# Patient Record
Sex: MALE | Race: WHITE
[De-identification: names, ages, dates, MRNs, and addresses within clinical notes are randomized per-mention and may not be internally consistent; named-entity substitution may affect disease eponyms.]

---

## 2017-06-02 ENCOUNTER — APPOINTMENT (OUTPATIENT)
Dept: INTERNAL MEDICINE | Facility: CLINIC | Age: 67
End: 2017-06-02

## 2017-06-02 VITALS
TEMPERATURE: 98.1 F | HEIGHT: 71 IN | SYSTOLIC BLOOD PRESSURE: 140 MMHG | HEART RATE: 57 BPM | OXYGEN SATURATION: 98 % | WEIGHT: 161 LBS | DIASTOLIC BLOOD PRESSURE: 80 MMHG | BODY MASS INDEX: 22.54 KG/M2

## 2017-06-02 VITALS — SYSTOLIC BLOOD PRESSURE: 132 MMHG | DIASTOLIC BLOOD PRESSURE: 68 MMHG

## 2017-06-02 DIAGNOSIS — Z87.898 PERSONAL HISTORY OF OTHER SPECIFIED CONDITIONS: ICD-10-CM

## 2017-06-02 RX ORDER — CELECOXIB 200 MG/1
200 CAPSULE ORAL
Qty: 60 | Refills: 0 | Status: DISCONTINUED | COMMUNITY
Start: 2016-04-18 | End: 2017-06-02

## 2017-06-30 ENCOUNTER — APPOINTMENT (OUTPATIENT)
Dept: ORTHOPEDIC SURGERY | Facility: CLINIC | Age: 67
End: 2017-06-30

## 2017-09-08 ENCOUNTER — APPOINTMENT (OUTPATIENT)
Dept: INTERNAL MEDICINE | Facility: CLINIC | Age: 67
End: 2017-09-08

## 2018-07-16 ENCOUNTER — RX RENEWAL (OUTPATIENT)
Age: 68
End: 2018-07-16

## 2018-07-26 ENCOUNTER — APPOINTMENT (OUTPATIENT)
Dept: INTERNAL MEDICINE | Facility: CLINIC | Age: 68
End: 2018-07-26

## 2018-09-04 ENCOUNTER — NON-APPOINTMENT (OUTPATIENT)
Age: 68
End: 2018-09-04

## 2018-09-04 ENCOUNTER — APPOINTMENT (OUTPATIENT)
Dept: INTERNAL MEDICINE | Facility: CLINIC | Age: 68
End: 2018-09-04
Payer: MEDICARE

## 2018-09-04 VITALS
HEIGHT: 71 IN | BODY MASS INDEX: 23.1 KG/M2 | HEART RATE: 49 BPM | SYSTOLIC BLOOD PRESSURE: 120 MMHG | TEMPERATURE: 98.2 F | OXYGEN SATURATION: 98 % | DIASTOLIC BLOOD PRESSURE: 70 MMHG | WEIGHT: 165 LBS

## 2018-09-04 DIAGNOSIS — Z87.898 PERSONAL HISTORY OF OTHER SPECIFIED CONDITIONS: ICD-10-CM

## 2018-09-04 PROCEDURE — 90750 HZV VACC RECOMBINANT IM: CPT | Mod: GY

## 2018-09-04 PROCEDURE — 90471 IMMUNIZATION ADMIN: CPT | Mod: GY

## 2018-09-04 PROCEDURE — 93000 ELECTROCARDIOGRAM COMPLETE: CPT | Mod: 59

## 2018-09-04 PROCEDURE — G0439: CPT

## 2018-09-04 PROCEDURE — 82270 OCCULT BLOOD FECES: CPT

## 2018-09-04 RX ORDER — ACYCLOVIR 400 MG/1
400 TABLET ORAL
Qty: 30 | Refills: 0 | Status: DISCONTINUED | COMMUNITY
Start: 2017-01-23 | End: 2018-09-04

## 2018-09-04 RX ORDER — DEXLANSOPRAZOLE 30 MG/1
30 CAPSULE, DELAYED RELEASE ORAL
Qty: 90 | Refills: 0 | Status: DISCONTINUED | COMMUNITY
Start: 2016-09-30 | End: 2018-09-04

## 2018-09-05 VITALS — SYSTOLIC BLOOD PRESSURE: 120 MMHG | DIASTOLIC BLOOD PRESSURE: 70 MMHG

## 2018-09-05 LAB
APPEARANCE: CLEAR
BACTERIA: NEGATIVE
BILIRUBIN URINE: NEGATIVE
BLOOD URINE: NEGATIVE
COLOR: YELLOW
GLUCOSE QUALITATIVE U: NEGATIVE MG/DL
KETONES URINE: NEGATIVE
LEUKOCYTE ESTERASE URINE: NEGATIVE
MICROSCOPIC-UA: NORMAL
NITRITE URINE: NEGATIVE
PH URINE: 6
PROTEIN URINE: NEGATIVE MG/DL
RED BLOOD CELLS URINE: 0 /HPF
SPECIFIC GRAVITY URINE: 1.01
SQUAMOUS EPITHELIAL CELLS: 0 /HPF
UROBILINOGEN URINE: NEGATIVE MG/DL
WHITE BLOOD CELLS URINE: 0 /HPF

## 2018-09-05 NOTE — HISTORY OF PRESENT ILLNESS
[FreeTextEntry1] : The patient is here for a routine visit.  [de-identified] : The patient notes that he feels tired a lot- he has had this for years but it might be worse.  He mostly feels tired later in the day.   He sleeps ok- uses Zolpidem 5 mg QHS.  No dizziness, lightheadedness.\par \par He exercises 4-5 days per week and his diet is healthy.  No chest pain or dyspnea.\par \par He has been started on Escitalopram 10 mg for depression symptoms which he feels has helped.  \par \par He sees a neurologist at [place] and has yearly MRIs.   He has occasional episodes at night which might be partial seizures and his neurologist is aware.

## 2018-09-05 NOTE — ASSESSMENT
[FreeTextEntry1] : The patient has worse fatigue which might be multifactorial including related to his medications, stress,  ild depression symptoms.  he does have a more prominent bradycardia and that could be a factor.  He was advised to see his cardiologist- he will see Dr. Lozano who he has seen in the past.  (He had seen a cardiologist last year in [place] and he says a stress test was ok.)  Check blood tests including metabolic, CBC, TFTs, testosterone.\par \par He sees his neurologist.  He requests a Lamotrigine level.\par \par Check lipids on Livalo.  Discussed diet and exercise.  Check a HgBA1c- last 5.8.  \par \par Shingrix #1 today.  He isn't sure if he has had either or both pneumonia vaccines.  He will try to find out- he will need the vaccines if he can't confirm he has had them.  \par \par Colonoscopy about 9/15.  He says another is planned soon- Dr. Johnson.  \par \par He sees a dermatologist and ophthalmologist.\par \par He is on Escitalopram as above.  \par \par Check a PSA.  \par \par He isn't fasting and will have the blood tests done elsewhere. \par

## 2018-09-05 NOTE — PHYSICAL EXAM

## 2018-09-05 NOTE — HEALTH RISK ASSESSMENT
[No falls in past year] : Patient reported no falls in the past year [0] : 2) Feeling down, depressed, or hopeless: Not at all (0) [Patient reported colonoscopy was normal] : Patient reported colonoscopy was normal [Fully functional (bathing, dressing, toileting, transferring, walking, feeding)] : Fully functional (bathing, dressing, toileting, transferring, walking, feeding) [Fully functional (using the telephone, shopping, preparing meals, housekeeping, doing laundry, using] : Fully functional and needs no help or supervision to perform IADLs (using the telephone, shopping, preparing meals, housekeeping, doing laundry, using transportation, managing medications and managing finances) [] : No [[id number]] : 0 [Change in mental status noted] : No change in mental status noted [Reports changes in hearing] : Reports no changes in hearing [Reports changes in vision] : Reports no changes in vision [Reports changes in dental health] : Reports no changes in dental health [ColonoscopyDate] : 9/15

## 2019-03-14 ENCOUNTER — APPOINTMENT (OUTPATIENT)
Dept: INTERNAL MEDICINE | Facility: CLINIC | Age: 69
End: 2019-03-14
Payer: COMMERCIAL

## 2019-03-14 PROCEDURE — 90471 IMMUNIZATION ADMIN: CPT

## 2019-03-14 PROCEDURE — 90750 HZV VACC RECOMBINANT IM: CPT

## 2019-07-01 ENCOUNTER — APPOINTMENT (OUTPATIENT)
Dept: INTERNAL MEDICINE | Facility: CLINIC | Age: 69
End: 2019-07-01
Payer: MEDICARE

## 2019-07-01 VITALS
HEART RATE: 50 BPM | WEIGHT: 165 LBS | HEIGHT: 71 IN | SYSTOLIC BLOOD PRESSURE: 120 MMHG | BODY MASS INDEX: 23.1 KG/M2 | DIASTOLIC BLOOD PRESSURE: 76 MMHG | OXYGEN SATURATION: 97 % | TEMPERATURE: 97.7 F

## 2019-07-01 PROCEDURE — 99213 OFFICE O/P EST LOW 20 MIN: CPT

## 2019-07-01 NOTE — ASSESSMENT
[FreeTextEntry1] : Soft tissue swelling w/ mild ttp along left upper posterior thigh below buttock. Unclear diagnosis, could be lipoma or hematoma. Send for soft tissue ultrasound. Use Tylenol and avoid NSAID's for now. Does not need antibiotics at this time.

## 2019-07-01 NOTE — HISTORY OF PRESENT ILLNESS
[FreeTextEntry8] : Patient comes for an acute visit. \par \par He initially developed pain followed by swelling of left buttock area first noticed about 6 days ago. Denies trauma to area. He denies edema of LLE. He tried Advil with some relief. He denies gait instability. He is physically active and has no issues with workouts. He is not sure if had a bug bite to area. He denies recent travel.

## 2019-07-01 NOTE — PHYSICAL EXAM
[No Acute Distress] : no acute distress [Well Nourished] : well nourished [Well-Appearing] : well-appearing [No Respiratory Distress] : no respiratory distress  [Clear to Auscultation] : lungs were clear to auscultation bilaterally [Normal Rate] : normal rate  [Normal S1, S2] : normal S1 and S2 [No Murmur] : no murmur heard [de-identified] : no LLE edema  [de-identified] : area of swelling w/ mild ttp along left posterior leg just below buttock; no surrounding erythema, warmth, or discharge

## 2019-07-02 ENCOUNTER — APPOINTMENT (OUTPATIENT)
Dept: ULTRASOUND IMAGING | Facility: CLINIC | Age: 69
End: 2019-07-02

## 2019-07-02 ENCOUNTER — OUTPATIENT (OUTPATIENT)
Dept: OUTPATIENT SERVICES | Facility: HOSPITAL | Age: 69
LOS: 1 days | End: 2019-07-02
Payer: MEDICARE

## 2019-07-02 DIAGNOSIS — Z00.8 ENCOUNTER FOR OTHER GENERAL EXAMINATION: ICD-10-CM

## 2019-07-02 PROCEDURE — 76882 US LMTD JT/FCL EVL NVASC XTR: CPT

## 2019-07-02 PROCEDURE — 76882 US LMTD JT/FCL EVL NVASC XTR: CPT | Mod: 26,LT

## 2019-07-05 ENCOUNTER — CHART COPY (OUTPATIENT)
Age: 69
End: 2019-07-05

## 2019-07-09 ENCOUNTER — APPOINTMENT (OUTPATIENT)
Dept: MRI IMAGING | Facility: HOSPITAL | Age: 69
End: 2019-07-09
Payer: MEDICARE

## 2019-07-09 ENCOUNTER — OUTPATIENT (OUTPATIENT)
Dept: OUTPATIENT SERVICES | Facility: HOSPITAL | Age: 69
LOS: 1 days | End: 2019-07-09
Payer: MEDICARE

## 2019-07-09 DIAGNOSIS — Z00.8 ENCOUNTER FOR OTHER GENERAL EXAMINATION: ICD-10-CM

## 2019-07-09 PROCEDURE — 73719 MRI LOWER EXTREMITY W/DYE: CPT

## 2019-07-09 PROCEDURE — A9579: CPT

## 2019-07-09 PROCEDURE — 73719 MRI LOWER EXTREMITY W/DYE: CPT | Mod: 26,LT

## 2019-07-11 ENCOUNTER — APPOINTMENT (OUTPATIENT)
Dept: INTERNAL MEDICINE | Facility: CLINIC | Age: 69
End: 2019-07-11
Payer: MEDICARE

## 2019-07-11 VITALS
DIASTOLIC BLOOD PRESSURE: 70 MMHG | HEART RATE: 61 BPM | TEMPERATURE: 98.2 F | WEIGHT: 165 LBS | SYSTOLIC BLOOD PRESSURE: 120 MMHG | HEIGHT: 71 IN | OXYGEN SATURATION: 96 % | BODY MASS INDEX: 23.1 KG/M2

## 2019-07-11 PROCEDURE — 99214 OFFICE O/P EST MOD 30 MIN: CPT

## 2019-07-11 RX ORDER — LOTEPREDNOL ETABONATE 5 MG/G
0.5 GEL OPHTHALMIC
Qty: 5 | Refills: 0 | Status: DISCONTINUED | COMMUNITY
Start: 2019-06-07

## 2019-07-11 RX ORDER — CEPHALEXIN 500 MG/1
500 CAPSULE ORAL
Qty: 14 | Refills: 0 | Status: DISCONTINUED | COMMUNITY
Start: 2019-02-13

## 2019-07-11 RX ORDER — OLOPATADINE HYDROCHLORIDE 2 MG/ML
0.2 SOLUTION OPHTHALMIC
Qty: 25 | Refills: 0 | Status: DISCONTINUED | COMMUNITY
Start: 2019-06-07

## 2019-07-11 RX ORDER — FINASTERIDE 1 MG/1
1 TABLET ORAL
Refills: 0 | Status: ACTIVE | COMMUNITY
Start: 2018-09-04

## 2019-07-11 RX ORDER — ESCITALOPRAM OXALATE 10 MG/1
10 TABLET ORAL
Qty: 90 | Refills: 1 | Status: DISCONTINUED | COMMUNITY
Start: 2018-09-04 | End: 2019-07-11

## 2019-07-11 NOTE — HISTORY OF PRESENT ILLNESS
[No Pertinent Cardiac History] : no history of aortic stenosis, atrial fibrillation, coronary artery disease, recent myocardial infarction, or implantable device/pacemaker [No Pertinent Pulmonary History] : no history of asthma, COPD, sleep apnea, or smoking [No Adverse Anesthesia Reaction] : no adverse anesthesia reaction in self or family member [(Patient denies any chest pain, claudication, dyspnea on exertion, orthopnea, palpitations or syncope)] : Patient denies any chest pain, claudication, dyspnea on exertion, orthopnea, palpitations or syncope [Excellent (>10 METs)] : Excellent (>10 METs) [Anti-Platelet Agents: _____] : Anti-Platelet Agents: [unfilled] [Chronic Anticoagulation] : no chronic anticoagulation [Diabetes] : no diabetes [Chronic Kidney Disease] : no chronic kidney disease [FreeTextEntry3] : Dr. Marty Bishop [FreeTextEntry1] : Biopsy and excision of left thigh mass  [FreeTextEntry2] : 7/18/19 [FreeTextEntry4] : Patient is scheduled for excision and biopsy of left thigh mass. He initially presented to office on 7/1 with painful lump on left thigh. He had an ultrasound performed which revealed a soft tissue mass of left thigh. He then had an MRI of the left thigh on 7/9 for further evaluation. He saw orthopedic surgeon - Dr. Marty Bishop on 7/10 and was advised on surgical excision of thigh mass.

## 2019-07-11 NOTE — PHYSICAL EXAM
[No Acute Distress] : no acute distress [Well Nourished] : well nourished [Well-Appearing] : well-appearing [No Respiratory Distress] : no respiratory distress  [Clear to Auscultation] : lungs were clear to auscultation bilaterally [Normal Rate] : normal rate  [Normal S1, S2] : normal S1 and S2 [No Murmur] : no murmur heard [Normal Voice/Communication] : normal voice/communication [Normal Sclera/Conjunctiva] : normal sclera/conjunctiva [EOMI] : extraocular movements intact [PERRL] : pupils equal round and reactive to light [Supple] : supple [Normal TMs] : both tympanic membranes were normal [Normal Oropharynx] : the oropharynx was normal [No Edema] : there was no peripheral edema [No Lymphadenopathy] : no lymphadenopathy [Regular Rhythm] : with a regular rhythm [Non Tender] : non-tender [Soft] : abdomen soft [Normal Bowel Sounds] : normal bowel sounds [Non-distended] : non-distended [Normal Gait] : normal gait [No Rash] : no rash [Alert and Oriented x3] : oriented to person, place, and time [Normal Mood] : the mood was normal [Normal Affect] : the affect was normal [Normal Insight/Judgement] : insight and judgment were intact [de-identified] : mild swelling along left posterior thigh

## 2019-07-11 NOTE — RESULTS/DATA
[] : results reviewed [de-identified] : normal [de-identified] : normal [de-identified] : normal [de-identified] : workup performed on 7/10/19 [de-identified] : sinus bradycardia @ 46, no ST-T abnormalities; study unchanged  [de-identified] : no acute cardiopulmonary abnormalities

## 2019-07-11 NOTE — ASSESSMENT
[Patient Optimized for Surgery] : Patient optimized for surgery [No Further Testing Recommended] : no further testing recommended [As per surgery] : as per surgery [Modify anti-platelet treatment prior to procedure] : Modify anti-platelet treatment prior to procedure [FreeTextEntry4] : Patient is a low cardiac risk for a low risk surgical procedure. No medical contraindications for planned surgery. Preoperative workup on 7/10 including labs, EKG, and chest x-ray were all unremarkable. He has stopped the baby aspirin and will avoid NSAID's starting now and until date of surgery. He can take Tylenol as needed for pain. He has seizure disorder and will take his Lamictal as scheduled three times a day. \par  [FreeTextEntry6] : No aspirin or NSAID's starting now and until date of surgery

## 2019-07-11 NOTE — PHYSICAL EXAM
[Well Nourished] : well nourished [No Acute Distress] : no acute distress [Well-Appearing] : well-appearing [No Respiratory Distress] : no respiratory distress  [Clear to Auscultation] : lungs were clear to auscultation bilaterally [Normal Rate] : normal rate  [Normal S1, S2] : normal S1 and S2 [No Murmur] : no murmur heard [Normal Voice/Communication] : normal voice/communication [Normal Sclera/Conjunctiva] : normal sclera/conjunctiva [EOMI] : extraocular movements intact [PERRL] : pupils equal round and reactive to light [Normal TMs] : both tympanic membranes were normal [Supple] : supple [Normal Oropharynx] : the oropharynx was normal [Regular Rhythm] : with a regular rhythm [No Lymphadenopathy] : no lymphadenopathy [No Edema] : there was no peripheral edema [Soft] : abdomen soft [Non Tender] : non-tender [Normal Bowel Sounds] : normal bowel sounds [Non-distended] : non-distended [Normal Gait] : normal gait [No Rash] : no rash [Alert and Oriented x3] : oriented to person, place, and time [Normal Mood] : the mood was normal [Normal Affect] : the affect was normal [Normal Insight/Judgement] : insight and judgment were intact [de-identified] : mild swelling along left posterior thigh

## 2019-07-11 NOTE — RESULTS/DATA
[] : results reviewed [de-identified] : normal [de-identified] : normal [de-identified] : normal [de-identified] : sinus bradycardia @ 46, no ST-T abnormalities; study unchanged  [de-identified] : workup performed on 7/10/19 [de-identified] : no acute cardiopulmonary abnormalities

## 2019-07-11 NOTE — ASSESSMENT
[Patient Optimized for Surgery] : Patient optimized for surgery [No Further Testing Recommended] : no further testing recommended [As per surgery] : as per surgery [Modify anti-platelet treatment prior to procedure] : Modify anti-platelet treatment prior to procedure [FreeTextEntry6] : No aspirin or NSAID's starting now and until date of surgery [FreeTextEntry4] : Patient is a low cardiac risk for a low risk surgical procedure. No medical contraindications for planned surgery. Preoperative workup on 7/10 including labs, EKG, and chest x-ray were all unremarkable. He has stopped the baby aspirin and will avoid NSAID's starting now and until date of surgery. He can take Tylenol as needed for pain. He has seizure disorder and will take his Lamictal as scheduled three times a day. \par

## 2019-07-11 NOTE — HISTORY OF PRESENT ILLNESS
[No Pertinent Cardiac History] : no history of aortic stenosis, atrial fibrillation, coronary artery disease, recent myocardial infarction, or implantable device/pacemaker [No Pertinent Pulmonary History] : no history of asthma, COPD, sleep apnea, or smoking [No Adverse Anesthesia Reaction] : no adverse anesthesia reaction in self or family member [(Patient denies any chest pain, claudication, dyspnea on exertion, orthopnea, palpitations or syncope)] : Patient denies any chest pain, claudication, dyspnea on exertion, orthopnea, palpitations or syncope [Excellent (>10 METs)] : Excellent (>10 METs) [Anti-Platelet Agents: _____] : Anti-Platelet Agents: [unfilled] [Chronic Anticoagulation] : no chronic anticoagulation [Diabetes] : no diabetes [Chronic Kidney Disease] : no chronic kidney disease [FreeTextEntry3] : Dr. Marty Bishop [FreeTextEntry2] : 7/18/19 [FreeTextEntry1] : Biopsy and excision of left thigh mass  [FreeTextEntry4] : Patient is scheduled for excision and biopsy of left thigh mass. He initially presented to office on 7/1 with painful lump on left thigh. He had an ultrasound performed which revealed a soft tissue mass of left thigh. He then had an MRI of the left thigh on 7/9 for further evaluation. He saw orthopedic surgeon - Dr. Marty Bishop on 7/10 and was advised on surgical excision of thigh mass.

## 2020-06-29 ENCOUNTER — APPOINTMENT (OUTPATIENT)
Dept: INTERNAL MEDICINE | Facility: CLINIC | Age: 70
End: 2020-06-29
Payer: MEDICARE

## 2020-06-29 VITALS
TEMPERATURE: 98.1 F | HEART RATE: 54 BPM | HEIGHT: 69.5 IN | BODY MASS INDEX: 24.32 KG/M2 | WEIGHT: 168 LBS | OXYGEN SATURATION: 95 %

## 2020-06-29 PROCEDURE — 99214 OFFICE O/P EST MOD 30 MIN: CPT | Mod: 25

## 2020-06-29 PROCEDURE — 36415 COLL VENOUS BLD VENIPUNCTURE: CPT

## 2020-06-29 RX ORDER — ESCITALOPRAM OXALATE 10 MG/1
10 TABLET ORAL
Qty: 90 | Refills: 3 | Status: ACTIVE | COMMUNITY
Start: 2020-06-29

## 2020-07-10 VITALS — SYSTOLIC BLOOD PRESSURE: 125 MMHG | HEART RATE: 48 BPM | DIASTOLIC BLOOD PRESSURE: 70 MMHG

## 2020-07-10 NOTE — ASSESSMENT
[FreeTextEntry1] : The patient has fatigue as above, more in the afternoon.  He doesn't have specific symptoms to explain it.  He is bradycardic to 49 which could be a factor.  The symptoms might be multifactorial including bradycardia, medication related (Lamictal?) and depression could be considered.  He is on Lexapro 10 mg now which could be increased.  Also consider CATHIE as a possibility and consider a sleep evaluation.  Will check blood tests including a CBC, metabolic, TFTs.  Follow-up next week.  \par \par Also consider evaluation of the reflux symptoms.  Advise seeing an ENT and consider a GI evaluation.

## 2020-07-10 NOTE — HISTORY OF PRESENT ILLNESS
[de-identified] : The patient comes in with his wife.  He feels sluggish in the afternoon, sometimes in the morning.  It is not new but worse in the last six months.  He says he has more fatigue if he eats sugar.  He has fatigue and must lie down.  It doesn't limit him if he is busy.  No chest pain, dyspnea, lightheadedness.  he tries to exercise.\par \par He says the acid reflux is worse and he must clear his throat.  He has been told of GERD for a few years.  He's not on medication for it. No dysphagia or odynophagia.  He feels it in his throat.  His appetite is good and weight is stable.  He is not on Dexilant now.  \par \par He still has had seizures.

## 2020-07-14 LAB
ALBUMIN SERPL ELPH-MCNC: 4.6 G/DL
ALP BLD-CCNC: 73 U/L
ALT SERPL-CCNC: 19 U/L
ANION GAP SERPL CALC-SCNC: 10 MMOL/L
AST SERPL-CCNC: 26 U/L
BASOPHILS # BLD AUTO: 0.02 K/UL
BASOPHILS NFR BLD AUTO: 0.4 %
BILIRUB SERPL-MCNC: 0.3 MG/DL
BUN SERPL-MCNC: 20 MG/DL
CALCIUM SERPL-MCNC: 9.7 MG/DL
CHLORIDE SERPL-SCNC: 106 MMOL/L
CO2 SERPL-SCNC: 25 MMOL/L
CREAT SERPL-MCNC: 1.24 MG/DL
EOSINOPHIL # BLD AUTO: 0.2 K/UL
EOSINOPHIL NFR BLD AUTO: 3.8 %
ESTIMATED AVERAGE GLUCOSE: 114 MG/DL
GLUCOSE SERPL-MCNC: 87 MG/DL
HBA1C MFR BLD HPLC: 5.6 %
HCT VFR BLD CALC: 41.7 %
HGB BLD-MCNC: 14 G/DL
IMM GRANULOCYTES NFR BLD AUTO: 0.2 %
LYMPHOCYTES # BLD AUTO: 1.39 K/UL
LYMPHOCYTES NFR BLD AUTO: 26.5 %
MAN DIFF?: NORMAL
MCHC RBC-ENTMCNC: 31.8 PG
MCHC RBC-ENTMCNC: 33.6 GM/DL
MCV RBC AUTO: 94.8 FL
MONOCYTES # BLD AUTO: 0.44 K/UL
MONOCYTES NFR BLD AUTO: 8.4 %
NEUTROPHILS # BLD AUTO: 3.18 K/UL
NEUTROPHILS NFR BLD AUTO: 60.7 %
PLATELET # BLD AUTO: 232 K/UL
POTASSIUM SERPL-SCNC: 4.7 MMOL/L
PROT SERPL-MCNC: 6.4 G/DL
RBC # BLD: 4.4 M/UL
RBC # FLD: 12.9 %
SODIUM SERPL-SCNC: 141 MMOL/L
T4 FREE SERPL-MCNC: 1.1 NG/DL
TSH SERPL-ACNC: 3.5 UIU/ML
WBC # FLD AUTO: 5.24 K/UL

## 2020-09-29 ENCOUNTER — APPOINTMENT (OUTPATIENT)
Dept: INTERNAL MEDICINE | Facility: CLINIC | Age: 70
End: 2020-09-29
Payer: MEDICARE

## 2020-09-29 PROCEDURE — 90662 IIV NO PRSV INCREASED AG IM: CPT

## 2020-09-29 PROCEDURE — G0008: CPT

## 2021-06-08 ENCOUNTER — APPOINTMENT (OUTPATIENT)
Dept: INTERNAL MEDICINE | Facility: CLINIC | Age: 71
End: 2021-06-08
Payer: MEDICARE

## 2021-06-08 VITALS
OXYGEN SATURATION: 97 % | HEIGHT: 70 IN | BODY MASS INDEX: 24.48 KG/M2 | WEIGHT: 171 LBS | HEART RATE: 54 BPM | TEMPERATURE: 97.3 F

## 2021-06-08 VITALS — SYSTOLIC BLOOD PRESSURE: 115 MMHG | DIASTOLIC BLOOD PRESSURE: 75 MMHG

## 2021-06-08 PROCEDURE — 99213 OFFICE O/P EST LOW 20 MIN: CPT

## 2021-06-08 NOTE — PHYSICAL EXAM
[Normal] : no jugular venous distention, supple, no lymphadenopathy and the thyroid was normal and there were no nodules present [de-identified] : no specific abnormalities seen at his site of concern.  No erythema, lump, mass or swelling.

## 2021-06-08 NOTE — ASSESSMENT
[FreeTextEntry1] : The patient has midline upper gum tenderness and soreness for two weeks.  No obvious abnormality on exam.  Consider mild trauma from the water pick.  He was advised to see his dentist to evaluate.  If no cause found and the symptoms persist, he'll be referred to an oral pathologist.  Call PRN.

## 2021-06-08 NOTE — HISTORY OF PRESENT ILLNESS
[FreeTextEntry8] : The patient has symptoms for two weeks- he has a feeling of soreness in the midline upper gums where it meets the interior lip.  He doesn't feel a distinct lump or swelling. No rash, discharge, URI symptoms, fever.  No definite trauma although he does use a water pick in the area. His lips can feel numb. It is uncomfortable.

## 2021-06-15 ENCOUNTER — NON-APPOINTMENT (OUTPATIENT)
Age: 71
End: 2021-06-15

## 2021-07-24 ENCOUNTER — TRANSCRIPTION ENCOUNTER (OUTPATIENT)
Age: 71
End: 2021-07-24

## 2021-07-25 ENCOUNTER — TRANSCRIPTION ENCOUNTER (OUTPATIENT)
Age: 71
End: 2021-07-25

## 2021-07-30 ENCOUNTER — TRANSCRIPTION ENCOUNTER (OUTPATIENT)
Age: 71
End: 2021-07-30

## 2021-07-31 ENCOUNTER — NON-APPOINTMENT (OUTPATIENT)
Age: 71
End: 2021-07-31

## 2021-08-02 ENCOUNTER — APPOINTMENT (OUTPATIENT)
Dept: INTERNAL MEDICINE | Facility: CLINIC | Age: 71
End: 2021-08-02
Payer: MEDICARE

## 2021-08-02 ENCOUNTER — NON-APPOINTMENT (OUTPATIENT)
Age: 71
End: 2021-08-02

## 2021-08-02 VITALS
WEIGHT: 169 LBS | HEART RATE: 57 BPM | HEIGHT: 70 IN | BODY MASS INDEX: 24.2 KG/M2 | TEMPERATURE: 97.9 F | OXYGEN SATURATION: 96 %

## 2021-08-02 DIAGNOSIS — L64.9 ANDROGENIC ALOPECIA, UNSPECIFIED: ICD-10-CM

## 2021-08-02 DIAGNOSIS — Z23 ENCOUNTER FOR IMMUNIZATION: ICD-10-CM

## 2021-08-02 DIAGNOSIS — B00.9 HERPESVIRAL INFECTION, UNSPECIFIED: ICD-10-CM

## 2021-08-02 DIAGNOSIS — R22.42 LOCALIZED SWELLING, MASS AND LUMP, LEFT LOWER LIMB: ICD-10-CM

## 2021-08-02 DIAGNOSIS — Z92.29 PERSONAL HISTORY OF OTHER DRUG THERAPY: ICD-10-CM

## 2021-08-02 DIAGNOSIS — Z01.818 ENCOUNTER FOR OTHER PREPROCEDURAL EXAMINATION: ICD-10-CM

## 2021-08-02 PROCEDURE — G0439: CPT

## 2021-08-02 PROCEDURE — 36415 COLL VENOUS BLD VENIPUNCTURE: CPT

## 2021-08-02 PROCEDURE — 93000 ELECTROCARDIOGRAM COMPLETE: CPT | Mod: 59

## 2021-08-02 PROCEDURE — G0444 DEPRESSION SCREEN ANNUAL: CPT

## 2021-08-02 PROCEDURE — 82270 OCCULT BLOOD FECES: CPT

## 2021-08-18 ENCOUNTER — TRANSCRIPTION ENCOUNTER (OUTPATIENT)
Age: 71
End: 2021-08-18

## 2021-08-20 VITALS — DIASTOLIC BLOOD PRESSURE: 70 MMHG | SYSTOLIC BLOOD PRESSURE: 128 MMHG

## 2021-08-20 NOTE — PHYSICAL EXAM
[No Acute Distress] : no acute distress [Well Nourished] : well nourished [Well Developed] : well developed [Well-Appearing] : well-appearing [Normal Sclera/Conjunctiva] : normal sclera/conjunctiva [PERRL] : pupils equal round and reactive to light [EOMI] : extraocular movements intact [Normal Outer Ear/Nose] : the outer ears and nose were normal in appearance [Normal Oropharynx] : the oropharynx was normal [No JVD] : no jugular venous distention [No Lymphadenopathy] : no lymphadenopathy [Supple] : supple [Thyroid Normal, No Nodules] : the thyroid was normal and there were no nodules present [No Respiratory Distress] : no respiratory distress  [No Accessory Muscle Use] : no accessory muscle use [Clear to Auscultation] : lungs were clear to auscultation bilaterally [Normal Rate] : normal rate  [Regular Rhythm] : with a regular rhythm [Normal S1, S2] : normal S1 and S2 [No Murmur] : no murmur heard [No Carotid Bruits] : no carotid bruits [No Abdominal Bruit] : a ~M bruit was not heard ~T in the abdomen [No Varicosities] : no varicosities [Pedal Pulses Present] : the pedal pulses are present [No Edema] : there was no peripheral edema [No Palpable Aorta] : no palpable aorta [No Extremity Clubbing/Cyanosis] : no extremity clubbing/cyanosis [Soft] : abdomen soft [Non Tender] : non-tender [Non-distended] : non-distended [No Masses] : no abdominal mass palpated [No HSM] : no HSM [Normal Bowel Sounds] : normal bowel sounds [Normal Sphincter Tone] : normal sphincter tone [No Mass] : no mass [Normal Posterior Cervical Nodes] : no posterior cervical lymphadenopathy [Normal Anterior Cervical Nodes] : no anterior cervical lymphadenopathy [No CVA Tenderness] : no CVA  tenderness [No Spinal Tenderness] : no spinal tenderness [Grossly Normal Strength/Tone] : grossly normal strength/tone [No Joint Swelling] : no joint swelling [No Rash] : no rash [Coordination Grossly Intact] : coordination grossly intact [No Focal Deficits] : no focal deficits [Normal Gait] : normal gait [Deep Tendon Reflexes (DTR)] : deep tendon reflexes were 2+ and symmetric [Normal Affect] : the affect was normal [Normal Insight/Judgement] : insight and judgment were intact [Stool Occult Blood] : stool negative for occult blood

## 2021-08-20 NOTE — HEALTH RISK ASSESSMENT
[No] : No [No falls in past year] : Patient reported no falls in the past year [0] : 2) Feeling down, depressed, or hopeless: Not at all (0) [Fully functional (bathing, dressing, toileting, transferring, walking, feeding)] : Fully functional (bathing, dressing, toileting, transferring, walking, feeding) [Fully functional (using the telephone, shopping, preparing meals, housekeeping, doing laundry, using] : Fully functional and needs no help or supervision to perform IADLs (using the telephone, shopping, preparing meals, housekeeping, doing laundry, using transportation, managing medications and managing finances) [] : No [[id number]] : 0 [Reports changes in hearing] : Reports no changes in hearing [Reports changes in vision] : Reports no changes in vision [Reports changes in dental health] : Reports no changes in dental health

## 2021-08-20 NOTE — HISTORY OF PRESENT ILLNESS
[FreeTextEntry1] : The patient is here for a routine visit.  [de-identified] : He still has similar symptoms of his upper lip- it feels irritated, like a cut tip.  He has seen two dentists and he says X-rays didn't show any abnormality. He feels it in his lip.  He had been using a new water pick and he thinks this might be the explanation.  No specific mass, discharge.\par \par His diet is mostly ok.  He exercises 3-4 times per week.  No chest pain or dyspnea.\par \par He sees a cardiologist in [place] and he has periodic stress tests.  He was there about six months ago.

## 2021-08-20 NOTE — ASSESSMENT
[FreeTextEntry1] : He continues to have upper lip symptoms as above.  He is seeing an oral pathologist this week for further evaluation.\par \par Discussed diet and exercise.  Check lipids.\par \par He sees his cardiologist as above.\par \par He is doing well from a depression standpoint.\par \par Check a PSA.\par \par Check a B-12.  \par \par He has had the COVID-19 vaccine.\par \par S/p Shingrix.  He says he has had one of the pneumonia vaccines- he will check his records for the specifics.  \par \par Colonoscopy 10/18- five year follow-up.  \par \par He sees his neurologist and has an MRI yearly which he says is UTD.\par \par He sees an ophthalmologist.

## 2021-08-23 ENCOUNTER — NON-APPOINTMENT (OUTPATIENT)
Age: 71
End: 2021-08-23

## 2021-08-23 LAB
25(OH)D3 SERPL-MCNC: 53.2 NG/ML
ALBUMIN SERPL ELPH-MCNC: 4.5 G/DL
ALP BLD-CCNC: 82 U/L
ALT SERPL-CCNC: 29 U/L
ANION GAP SERPL CALC-SCNC: 12 MMOL/L
APPEARANCE: CLEAR
AST SERPL-CCNC: 26 U/L
BACTERIA: NEGATIVE
BASOPHILS # BLD AUTO: 0.03 K/UL
BASOPHILS NFR BLD AUTO: 0.5 %
BILIRUB SERPL-MCNC: 0.4 MG/DL
BILIRUBIN URINE: NEGATIVE
BLOOD URINE: NEGATIVE
BUN SERPL-MCNC: 20 MG/DL
CALCIUM SERPL-MCNC: 10 MG/DL
CHLORIDE SERPL-SCNC: 103 MMOL/L
CHOLEST SERPL-MCNC: 175 MG/DL
CO2 SERPL-SCNC: 23 MMOL/L
COLOR: YELLOW
CREAT SERPL-MCNC: 1.22 MG/DL
EOSINOPHIL # BLD AUTO: 0.23 K/UL
EOSINOPHIL NFR BLD AUTO: 4.2 %
ESTIMATED AVERAGE GLUCOSE: 111 MG/DL
GLUCOSE QUALITATIVE U: NEGATIVE
GLUCOSE SERPL-MCNC: 73 MG/DL
HBA1C MFR BLD HPLC: 5.5 %
HCT VFR BLD CALC: 42 %
HDLC SERPL-MCNC: 73 MG/DL
HGB BLD-MCNC: 13.8 G/DL
HYALINE CASTS: 1 /LPF
IMM GRANULOCYTES NFR BLD AUTO: 0.2 %
KETONES URINE: NEGATIVE
LDLC SERPL CALC-MCNC: 83 MG/DL
LEUKOCYTE ESTERASE URINE: NEGATIVE
LYMPHOCYTES # BLD AUTO: 1.55 K/UL
LYMPHOCYTES NFR BLD AUTO: 28.3 %
MAN DIFF?: NORMAL
MCHC RBC-ENTMCNC: 31.4 PG
MCHC RBC-ENTMCNC: 32.9 GM/DL
MCV RBC AUTO: 95.7 FL
MICROSCOPIC-UA: NORMAL
MONOCYTES # BLD AUTO: 0.36 K/UL
MONOCYTES NFR BLD AUTO: 6.6 %
NEUTROPHILS # BLD AUTO: 3.29 K/UL
NEUTROPHILS NFR BLD AUTO: 60.2 %
NITRITE URINE: NEGATIVE
NONHDLC SERPL-MCNC: 102 MG/DL
PH URINE: 6
PLATELET # BLD AUTO: 254 K/UL
POTASSIUM SERPL-SCNC: 4.7 MMOL/L
PROT SERPL-MCNC: 6.5 G/DL
PROTEIN URINE: NORMAL
PSA SERPL-MCNC: 4.03 NG/ML
RBC # BLD: 4.39 M/UL
RBC # FLD: 12.9 %
RED BLOOD CELLS URINE: 1 /HPF
SODIUM SERPL-SCNC: 139 MMOL/L
SPECIFIC GRAVITY URINE: 1.02
SQUAMOUS EPITHELIAL CELLS: 0 /HPF
T4 FREE SERPL-MCNC: 1 NG/DL
TRIGL SERPL-MCNC: 93 MG/DL
TSH SERPL-ACNC: 4.32 UIU/ML
UROBILINOGEN URINE: NORMAL
VIT B12 SERPL-MCNC: 982 PG/ML
WBC # FLD AUTO: 5.47 K/UL
WHITE BLOOD CELLS URINE: 1 /HPF

## 2021-09-06 ENCOUNTER — TRANSCRIPTION ENCOUNTER (OUTPATIENT)
Age: 71
End: 2021-09-06

## 2021-09-27 ENCOUNTER — NON-APPOINTMENT (OUTPATIENT)
Age: 71
End: 2021-09-27

## 2021-09-27 ENCOUNTER — APPOINTMENT (OUTPATIENT)
Dept: UROLOGY | Facility: CLINIC | Age: 71
End: 2021-09-27

## 2021-09-28 ENCOUNTER — TRANSCRIPTION ENCOUNTER (OUTPATIENT)
Age: 71
End: 2021-09-28

## 2022-07-21 ENCOUNTER — APPOINTMENT (OUTPATIENT)
Dept: INTERNAL MEDICINE | Facility: CLINIC | Age: 72
End: 2022-07-21

## 2022-07-21 VITALS
WEIGHT: 165 LBS | HEART RATE: 56 BPM | BODY MASS INDEX: 23.62 KG/M2 | HEIGHT: 70 IN | TEMPERATURE: 97.3 F | OXYGEN SATURATION: 98 %

## 2022-07-21 VITALS — DIASTOLIC BLOOD PRESSURE: 68 MMHG | SYSTOLIC BLOOD PRESSURE: 120 MMHG

## 2022-07-21 PROCEDURE — 99214 OFFICE O/P EST MOD 30 MIN: CPT | Mod: 25

## 2022-07-21 PROCEDURE — 36415 COLL VENOUS BLD VENIPUNCTURE: CPT

## 2022-07-21 NOTE — HISTORY OF PRESENT ILLNESS
[de-identified] : The patient has a pain under his upper lip for a couple of months.  There are two sores which are tender. No fever, discharge.  He saw a dermatologist, Dr. Helms and canker sores were considered. he was prescribed Halibetasol which might have helped a little but the area is worse overall.  He saw his dentist- he has a bridge which was shaved down which didn't help.  No obvious trauma and the bridge is not exactly in the same location.  No dysphagia, odynophagia, sore throat, cough.\par \par He had a lesion in the same area last year- this feels a little different but it's in the same location.  That time it resolved and he had been well until two months ago.  He saw an oral pathologist, Dr. Slater, at the time.  \par \par He notes a dry mouth and occasionally dry eyes.

## 2022-07-21 NOTE — ASSESSMENT
[FreeTextEntry1] : He has oral lesions as above.  It is persistent and the cause is unclear.  He has already seen a dermatologist and his dentist.  He already has an appointment to see the oral pathologist, Dr. frank, in two weeks and he will go.  He will follow-up here for his CPE.\par \par There is some swelling and tenderness and will try an antibiotic to see if it helps.\par \par Check Sjogren antibodies given the dry mouth.\par \par We discussed the elevated PSA from last year.  He says he saw a urologist in [place] and the PSA was repeated and the patient says it was ok.  He will follow-up there in the fall.

## 2022-07-21 NOTE — PHYSICAL EXAM
[Normal] : no jugular venous distention, supple, no lymphadenopathy and the thyroid was normal and there were no nodules present [de-identified] : O/P two slightly raised, clear lesion of the upper inner lip, mildly tender and swollen

## 2022-08-11 ENCOUNTER — APPOINTMENT (OUTPATIENT)
Dept: INTERNAL MEDICINE | Facility: CLINIC | Age: 72
End: 2022-08-11

## 2022-08-11 ENCOUNTER — NON-APPOINTMENT (OUTPATIENT)
Age: 72
End: 2022-08-11

## 2022-08-11 VITALS
HEIGHT: 69.5 IN | OXYGEN SATURATION: 97 % | TEMPERATURE: 97.1 F | HEART RATE: 73 BPM | BODY MASS INDEX: 24.32 KG/M2 | WEIGHT: 168 LBS

## 2022-08-11 DIAGNOSIS — K13.79 OTHER LESIONS OF ORAL MUCOSA: ICD-10-CM

## 2022-08-11 DIAGNOSIS — Z86.69 PERSONAL HISTORY OF OTHER DISEASES OF THE NERVOUS SYSTEM AND SENSE ORGANS: ICD-10-CM

## 2022-08-11 LAB
CRP SERPL-MCNC: <3 MG/L
ENA SS-A AB SER IA-ACNC: <0.2 AL
ENA SS-B AB SER IA-ACNC: <0.2 AL
ERYTHROCYTE [SEDIMENTATION RATE] IN BLOOD BY WESTERGREN METHOD: 17 MM/HR

## 2022-08-11 PROCEDURE — 93000 ELECTROCARDIOGRAM COMPLETE: CPT | Mod: 59

## 2022-08-11 PROCEDURE — G0439: CPT

## 2022-08-11 PROCEDURE — G0444 DEPRESSION SCREEN ANNUAL: CPT

## 2022-08-11 RX ORDER — CEPHALEXIN 500 MG/1
500 TABLET ORAL
Qty: 21 | Refills: 0 | Status: DISCONTINUED | COMMUNITY
Start: 2022-07-21 | End: 2022-08-11

## 2022-08-12 ENCOUNTER — APPOINTMENT (OUTPATIENT)
Dept: INTERNAL MEDICINE | Facility: CLINIC | Age: 72
End: 2022-08-12

## 2022-08-12 PROCEDURE — 36415 COLL VENOUS BLD VENIPUNCTURE: CPT

## 2022-08-15 DIAGNOSIS — Z23 ENCOUNTER FOR IMMUNIZATION: ICD-10-CM

## 2022-12-23 ENCOUNTER — NON-APPOINTMENT (OUTPATIENT)
Age: 72
End: 2022-12-23

## 2022-12-23 LAB
25(OH)D3 SERPL-MCNC: 54.6 NG/ML
ALBUMIN SERPL ELPH-MCNC: 4.5 G/DL
ALP BLD-CCNC: 93 U/L
ALT SERPL-CCNC: 18 U/L
ANION GAP SERPL CALC-SCNC: 10 MMOL/L
APPEARANCE: CLEAR
AST SERPL-CCNC: 22 U/L
BACTERIA: NEGATIVE
BASOPHILS # BLD AUTO: 0.03 K/UL
BASOPHILS NFR BLD AUTO: 0.4 %
BILIRUB SERPL-MCNC: 0.3 MG/DL
BILIRUBIN URINE: NEGATIVE
BLOOD URINE: NEGATIVE
BUN SERPL-MCNC: 19 MG/DL
CALCIUM SERPL-MCNC: 9.3 MG/DL
CHLORIDE SERPL-SCNC: 104 MMOL/L
CHOLEST SERPL-MCNC: 188 MG/DL
CO2 SERPL-SCNC: 25 MMOL/L
COLOR: NORMAL
CREAT SERPL-MCNC: 1.36 MG/DL
EGFR: 55 ML/MIN/1.73M2
EOSINOPHIL # BLD AUTO: 0.26 K/UL
EOSINOPHIL NFR BLD AUTO: 3.5 %
ESTIMATED AVERAGE GLUCOSE: 117 MG/DL
GLUCOSE QUALITATIVE U: NEGATIVE
GLUCOSE SERPL-MCNC: 102 MG/DL
HBA1C MFR BLD HPLC: 5.7 %
HCT VFR BLD CALC: 42.8 %
HDLC SERPL-MCNC: 77 MG/DL
HGB BLD-MCNC: 13.9 G/DL
HYALINE CASTS: 0 /LPF
IMM GRANULOCYTES NFR BLD AUTO: 0.3 %
KETONES URINE: NEGATIVE
LDLC SERPL CALC-MCNC: 95 MG/DL
LEUKOCYTE ESTERASE URINE: NEGATIVE
LYMPHOCYTES # BLD AUTO: 1.76 K/UL
LYMPHOCYTES NFR BLD AUTO: 23.9 %
MAN DIFF?: NORMAL
MCHC RBC-ENTMCNC: 31 PG
MCHC RBC-ENTMCNC: 32.5 GM/DL
MCV RBC AUTO: 95.5 FL
MICROSCOPIC-UA: NORMAL
MONOCYTES # BLD AUTO: 0.58 K/UL
MONOCYTES NFR BLD AUTO: 7.9 %
NEUTROPHILS # BLD AUTO: 4.7 K/UL
NEUTROPHILS NFR BLD AUTO: 64 %
NITRITE URINE: NEGATIVE
NONHDLC SERPL-MCNC: 111 MG/DL
PH URINE: 5.5
PLATELET # BLD AUTO: 207 K/UL
POTASSIUM SERPL-SCNC: 4.9 MMOL/L
PROT SERPL-MCNC: 6.6 G/DL
PROTEIN URINE: NEGATIVE
PSA FREE FLD-MCNC: 10 %
PSA FREE SERPL-MCNC: 0.33 NG/ML
PSA SERPL-MCNC: 3.16 NG/ML
RBC # BLD: 4.48 M/UL
RBC # FLD: 13 %
RED BLOOD CELLS URINE: 1 /HPF
SODIUM SERPL-SCNC: 139 MMOL/L
SPECIFIC GRAVITY URINE: 1.02
SQUAMOUS EPITHELIAL CELLS: 0 /HPF
T4 FREE SERPL-MCNC: 1 NG/DL
TRIGL SERPL-MCNC: 78 MG/DL
TSH SERPL-ACNC: 5.4 UIU/ML
UROBILINOGEN URINE: NORMAL
WBC # FLD AUTO: 7.35 K/UL
WHITE BLOOD CELLS URINE: 0 /HPF

## 2022-12-23 NOTE — ASSESSMENT
[FreeTextEntry1] : He feels well.  Discussed diet and exercise.  He will return for blood tests including lipids on Livalo.  No recent cardiology.\par \par He sees the oral pathologist as above.\par \par He sees a urologist in [place] and he will return in the fall of 2022.   He requests a PSA now.\par \par S/p COVID-19 vaccine and two boosters.\par \par S/p Prevnar, Pneumovax, Shingrix.  \par \par Colonoscopy 10/18- five year follow-up.

## 2022-12-23 NOTE — HISTORY OF PRESENT ILLNESS
[FreeTextEntry1] : The patient is here for a routine visit.  [de-identified] : He has a healthy diet.  He exercises- bikes and walks.  No chest pain or dyspnea.\par \par He saw Dr. Slater- he was prescribed Fluocinonide and it is now 90% improved.\par \par He sees his neurologist, Dr. Kelvin Rocha, every six months.

## 2022-12-23 NOTE — HEALTH RISK ASSESSMENT
[Never] : Never [No] : No [No falls in past year] : Patient reported no falls in the past year [0] : 2) Feeling down, depressed, or hopeless: Not at all (0) [[id number]] : 0 [Fully functional (bathing, dressing, toileting, transferring, walking, feeding)] : Fully functional (bathing, dressing, toileting, transferring, walking, feeding) [Fully functional (using the telephone, shopping, preparing meals, housekeeping, doing laundry, using] : Fully functional and needs no help or supervision to perform IADLs (using the telephone, shopping, preparing meals, housekeeping, doing laundry, using transportation, managing medications and managing finances) [Reports changes in hearing] : Reports no changes in hearing [Reports changes in vision] : Reports no changes in vision [Reports changes in dental health] : Reports no changes in dental health

## 2022-12-23 NOTE — PHYSICAL EXAM
[No Acute Distress] : no acute distress [Well Nourished] : well nourished [Well Developed] : well developed [Well-Appearing] : well-appearing [Normal Sclera/Conjunctiva] : normal sclera/conjunctiva [PERRL] : pupils equal round and reactive to light [EOMI] : extraocular movements intact [Normal Outer Ear/Nose] : the outer ears and nose were normal in appearance [Normal Oropharynx] : the oropharynx was normal [No JVD] : no jugular venous distention [No Lymphadenopathy] : no lymphadenopathy [Supple] : supple [Thyroid Normal, No Nodules] : the thyroid was normal and there were no nodules present [No Respiratory Distress] : no respiratory distress  [No Accessory Muscle Use] : no accessory muscle use [Clear to Auscultation] : lungs were clear to auscultation bilaterally [Normal Rate] : normal rate  [Regular Rhythm] : with a regular rhythm [Normal S1, S2] : normal S1 and S2 [No Murmur] : no murmur heard [No Carotid Bruits] : no carotid bruits [No Abdominal Bruit] : a ~M bruit was not heard ~T in the abdomen [No Varicosities] : no varicosities [Pedal Pulses Present] : the pedal pulses are present [No Edema] : there was no peripheral edema [No Palpable Aorta] : no palpable aorta [No Extremity Clubbing/Cyanosis] : no extremity clubbing/cyanosis [Soft] : abdomen soft [Non Tender] : non-tender [Non-distended] : non-distended [No Masses] : no abdominal mass palpated [No HSM] : no HSM [Normal Bowel Sounds] : normal bowel sounds [Normal Posterior Cervical Nodes] : no posterior cervical lymphadenopathy [Normal Anterior Cervical Nodes] : no anterior cervical lymphadenopathy [No CVA Tenderness] : no CVA  tenderness [No Spinal Tenderness] : no spinal tenderness [No Joint Swelling] : no joint swelling [Grossly Normal Strength/Tone] : grossly normal strength/tone [No Rash] : no rash [Coordination Grossly Intact] : coordination grossly intact [No Focal Deficits] : no focal deficits [Normal Gait] : normal gait [Deep Tendon Reflexes (DTR)] : deep tendon reflexes were 2+ and symmetric [Normal Affect] : the affect was normal [Normal Insight/Judgement] : insight and judgment were intact [de-identified] : 110/60

## 2023-06-05 ENCOUNTER — NON-APPOINTMENT (OUTPATIENT)
Age: 73
End: 2023-06-05

## 2023-06-05 ENCOUNTER — APPOINTMENT (OUTPATIENT)
Dept: CARDIOLOGY | Facility: CLINIC | Age: 73
End: 2023-06-05
Payer: MEDICARE

## 2023-06-05 VITALS
OXYGEN SATURATION: 97 % | WEIGHT: 167 LBS | SYSTOLIC BLOOD PRESSURE: 135 MMHG | DIASTOLIC BLOOD PRESSURE: 79 MMHG | HEIGHT: 69 IN | BODY MASS INDEX: 24.73 KG/M2 | RESPIRATION RATE: 17 BRPM | HEART RATE: 51 BPM

## 2023-06-05 VITALS — DIASTOLIC BLOOD PRESSURE: 70 MMHG | HEART RATE: 52 BPM | SYSTOLIC BLOOD PRESSURE: 128 MMHG

## 2023-06-05 DIAGNOSIS — Z91.89 OTHER SPECIFIED PERSONAL RISK FACTORS, NOT ELSEWHERE CLASSIFIED: ICD-10-CM

## 2023-06-05 PROCEDURE — 93000 ELECTROCARDIOGRAM COMPLETE: CPT

## 2023-06-05 PROCEDURE — 99204 OFFICE O/P NEW MOD 45 MIN: CPT

## 2023-06-05 RX ORDER — MONTELUKAST 10 MG/1
10 TABLET, FILM COATED ORAL
Refills: 0 | Status: ACTIVE | COMMUNITY
Start: 2023-06-05

## 2023-06-05 RX ORDER — FLUOCINONIDE 0.5 MG/G
0.05 GEL TOPICAL
Qty: 60 | Refills: 0 | Status: COMPLETED | COMMUNITY
Start: 2022-08-04 | End: 2023-06-05

## 2023-06-05 NOTE — REVIEW OF SYSTEMS
[Feeling Fatigued] : feeling fatigued [Convulsions] : convulsions [Negative] : Heme/Lymph [FreeTextEntry7] : esophageal reflux

## 2023-06-05 NOTE — HISTORY OF PRESENT ILLNESS
[FreeTextEntry1] : Mr. Gerardo Valenzuela is a 72-year-old man with a family history of premature coronary artery disease and hypercholesterolemia presenting for evaluation and management of risk factors. He exercises regularly including elliptical , jogging and weights without chest pain, dyspnea or palpitations. There is no history of hypertension, diabetes and he has never been a cigarette smoker. His only complaint is afternoon fatigue which he can overcome for important occasions or to play golf, but does those activities with some fatigue.

## 2023-06-05 NOTE — DISCUSSION/SUMMARY
[Patient] : the patient [EKG obtained to assist in diagnosis and management of assessed problem(s)] : EKG obtained to assist in diagnosis and management of assessed problem(s) [FreeTextEntry1] : Concerning family history of coronary disease, on lipid lowering therapy, otherwise no cardiac medications. Will arrange treadmill exercise stress test.

## 2023-07-17 ENCOUNTER — APPOINTMENT (OUTPATIENT)
Dept: CARDIOLOGY | Facility: CLINIC | Age: 73
End: 2023-07-17
Payer: MEDICARE

## 2023-07-17 PROCEDURE — 93015 CV STRESS TEST SUPVJ I&R: CPT

## 2023-08-07 ENCOUNTER — APPOINTMENT (OUTPATIENT)
Dept: INTERNAL MEDICINE | Facility: CLINIC | Age: 73
End: 2023-08-07
Payer: MEDICARE

## 2023-08-07 VITALS — OXYGEN SATURATION: 97 % | TEMPERATURE: 98.6 F | WEIGHT: 168 LBS | HEART RATE: 62 BPM

## 2023-08-07 VITALS — SYSTOLIC BLOOD PRESSURE: 120 MMHG | DIASTOLIC BLOOD PRESSURE: 70 MMHG

## 2023-08-07 PROCEDURE — 99213 OFFICE O/P EST LOW 20 MIN: CPT | Mod: 25

## 2023-08-07 NOTE — ASSESSMENT
[FreeTextEntry1] : He has a URI with a cough and green phlegm. Will treat with Augmentin.  OTC meds discussed.  Call PRN.

## 2023-08-07 NOTE — HISTORY OF PRESENT ILLNESS
[FreeTextEntry8] : He has a cough with green phlegm for a week, not getting better. No dyspnea, chest pain, sinus pain, ear pain, fever.  He was around his grandchildren with URIs.  He is COVID-19 negative.

## 2023-08-15 ENCOUNTER — APPOINTMENT (OUTPATIENT)
Dept: INTERNAL MEDICINE | Facility: CLINIC | Age: 73
End: 2023-08-15
Payer: MEDICARE

## 2023-08-15 VITALS
WEIGHT: 172 LBS | BODY MASS INDEX: 25.48 KG/M2 | OXYGEN SATURATION: 99 % | HEART RATE: 59 BPM | SYSTOLIC BLOOD PRESSURE: 138 MMHG | DIASTOLIC BLOOD PRESSURE: 74 MMHG | HEIGHT: 69 IN | TEMPERATURE: 97.3 F

## 2023-08-15 DIAGNOSIS — J06.9 ACUTE UPPER RESPIRATORY INFECTION, UNSPECIFIED: ICD-10-CM

## 2023-08-15 DIAGNOSIS — J31.0 CHRONIC RHINITIS: ICD-10-CM

## 2023-08-15 PROCEDURE — 99214 OFFICE O/P EST MOD 30 MIN: CPT

## 2023-08-15 RX ORDER — FLUTICASONE PROPIONATE 50 UG/1
50 SPRAY, METERED NASAL DAILY
Qty: 1 | Refills: 1 | Status: ACTIVE | COMMUNITY
Start: 2023-08-15 | End: 1900-01-01

## 2023-08-15 RX ORDER — ALBUTEROL SULFATE 90 UG/1
108 (90 BASE) INHALANT RESPIRATORY (INHALATION)
Qty: 1 | Refills: 1 | Status: ACTIVE | COMMUNITY
Start: 2023-08-15 | End: 1900-01-01

## 2023-08-15 NOTE — PHYSICAL EXAM
[Normal Voice/Communication] : normal voice/communication [Normal Sclera/Conjunctiva] : normal sclera/conjunctiva [Normal Outer Ear/Nose] : the outer ears and nose were normal in appearance [Normal Oropharynx] : the oropharynx was normal [Normal TMs] : both tympanic membranes were normal [No Lymphadenopathy] : no lymphadenopathy [Normal] : normal rate, regular rhythm, normal S1 and S2 and no murmur heard [No Edema] : there was no peripheral edema [Soft] : abdomen soft [Non Tender] : non-tender [Non-distended] : non-distended [No Masses] : no abdominal mass palpated [Normal Bowel Sounds] : normal bowel sounds [de-identified] : No sinus tenderness. [de-identified] : Scant inspiratory wheezing superior lung fields.

## 2023-08-15 NOTE — HISTORY OF PRESENT ILLNESS
[FreeTextEntry8] : Starting about 2 weeks ago, patient developed a cough.  He saw Dr. Collins and got Augmentin.  He had a lot of green and non -bloody phlegm.  The antibiotic completed 2 weeks ago.  He feels that the cough has accelerated somewhat off the antibiotic.  He is going on a cruise this Saturday ([place], [place], [place], [place]  He has a drippy nose.  No fevers, chills.  He has a history of acid reflux, but it usually didn't' cause a cough.  He is on omeprazole.  He is also on Singular from a [place] doctor.  He is on Allegra as well.  About 2 weeks ago, he was with his grandchildren who were coughing.  His daughter-in-law also got sick.  Patient had a negative COVID-19 test about 10-14 days ago.  He had a COVID-19 vaccine about 2 months.

## 2023-08-15 NOTE — ASSESSMENT
[FreeTextEntry1] : Patient with an acute URI, and persistent rhinitis.  There is some slight wheezing as well.  Would hold further antibiotics but will give patient a Z-pack to hold onto for his cruise.  He should not use it if his symptoms remain stable and/or gradually improving.  He can use it for blood-tinged sputum, fevers, and dyspnea (and he should seek medical attention as well).  He can use the Singulair and Allegra for the rhinitis and will add Flonase as well.  Will also give benzonatate for the cough, and ProAir for the wheezing.

## 2023-09-05 ENCOUNTER — APPOINTMENT (OUTPATIENT)
Dept: INTERNAL MEDICINE | Facility: CLINIC | Age: 73
End: 2023-09-05
Payer: MEDICARE

## 2023-09-05 VITALS
HEIGHT: 69 IN | HEART RATE: 53 BPM | BODY MASS INDEX: 25.33 KG/M2 | WEIGHT: 171 LBS | TEMPERATURE: 97.7 F | OXYGEN SATURATION: 97 %

## 2023-09-05 VITALS — SYSTOLIC BLOOD PRESSURE: 115 MMHG | DIASTOLIC BLOOD PRESSURE: 75 MMHG

## 2023-09-05 DIAGNOSIS — F32.A DEPRESSION, UNSPECIFIED: ICD-10-CM

## 2023-09-05 DIAGNOSIS — Z87.2 PERSONAL HISTORY OF DISEASES OF THE SKIN AND SUBCUTANEOUS TISSUE: ICD-10-CM

## 2023-09-05 DIAGNOSIS — R53.83 OTHER FATIGUE: ICD-10-CM

## 2023-09-05 DIAGNOSIS — N28.9 DISORDER OF KIDNEY AND URETER, UNSPECIFIED: ICD-10-CM

## 2023-09-05 DIAGNOSIS — E78.5 HYPERLIPIDEMIA, UNSPECIFIED: ICD-10-CM

## 2023-09-05 DIAGNOSIS — Z00.00 ENCOUNTER FOR GENERAL ADULT MEDICAL EXAMINATION W/OUT ABNORMAL FINDINGS: ICD-10-CM

## 2023-09-05 PROCEDURE — G0439: CPT

## 2023-09-05 PROCEDURE — 36415 COLL VENOUS BLD VENIPUNCTURE: CPT

## 2023-09-05 RX ORDER — BENZONATATE 200 MG/1
200 CAPSULE ORAL
Qty: 42 | Refills: 0 | Status: DISCONTINUED | COMMUNITY
Start: 2023-08-15 | End: 2023-09-05

## 2023-09-05 RX ORDER — AMOXICILLIN AND CLAVULANATE POTASSIUM 875; 125 MG/1; MG/1
875-125 TABLET, COATED ORAL TWICE DAILY
Qty: 14 | Refills: 0 | Status: DISCONTINUED | COMMUNITY
Start: 2023-08-07 | End: 2023-09-05

## 2023-09-05 RX ORDER — AZITHROMYCIN 250 MG/1
250 TABLET, FILM COATED ORAL
Qty: 1 | Refills: 0 | Status: DISCONTINUED | COMMUNITY
Start: 2023-08-15 | End: 2023-09-05

## 2023-09-05 NOTE — ASSESSMENT
[FreeTextEntry1] : Discussed diet and exercise.  The BP is good.  Check lipids on Livalo.  He saw a cardiologist recently and an evaluation was unremarkable.  He sees his neurologist, Dr. Kelvin Rocha.  No change in Lamotrigine.  He requests a level.  He will see the oral pathologist, Dr. Slater.  He sees a dermatologist and ophthalmologist.  He sees a urologist in [place].  Last PSA WNL and he requests a PSA today.  COVID-19 new booster advised when available.  S/p Prevnar, Pneumovax, Shingrix.    Colonoscopy 10/18 and another is planned next week.

## 2023-09-05 NOTE — HEALTH RISK ASSESSMENT
[No] : No [No falls in past year] : Patient reported no falls in the past year [0] : 1) Little interest or pleasure doing things: Not at all (0) [1] : 2) Feeling down, depressed, or hopeless for several days (1) [[id number]] : 1 [Fully functional (bathing, dressing, toileting, transferring, walking, feeding)] : Fully functional (bathing, dressing, toileting, transferring, walking, feeding) [Fully functional (using the telephone, shopping, preparing meals, housekeeping, doing laundry, using] : Fully functional and needs no help or supervision to perform IADLs (using the telephone, shopping, preparing meals, housekeeping, doing laundry, using transportation, managing medications and managing finances) [Reports changes in hearing] : Reports no changes in hearing [Reports changes in vision] : Reports no changes in vision [Reports changes in dental health] : Reports no changes in dental health

## 2023-09-05 NOTE — HISTORY OF PRESENT ILLNESS
[FreeTextEntry1] : The patient is here for a routine visit.  [de-identified] : He feels well.  He exercises 3-4 days per week.  No chest pain or dyspnea.  Diet and appetite are good.    The previous URI has resolved.

## 2023-09-29 ENCOUNTER — APPOINTMENT (OUTPATIENT)
Age: 73
End: 2023-09-29

## 2023-10-05 ENCOUNTER — NON-APPOINTMENT (OUTPATIENT)
Age: 73
End: 2023-10-05

## 2023-10-06 LAB
ALBUMIN SERPL ELPH-MCNC: 4.4 G/DL
ALP BLD-CCNC: 94 U/L
ALT SERPL-CCNC: 22 U/L
ANION GAP SERPL CALC-SCNC: 12 MMOL/L
APPEARANCE: CLEAR
AST SERPL-CCNC: 27 U/L
BACTERIA: NEGATIVE /HPF
BILIRUB SERPL-MCNC: 0.4 MG/DL
BILIRUBIN URINE: NEGATIVE
BLOOD URINE: NEGATIVE
BUN SERPL-MCNC: 20 MG/DL
CALCIUM SERPL-MCNC: 9.5 MG/DL
CAST: 0 /LPF
CHLORIDE SERPL-SCNC: 103 MMOL/L
CHOLEST SERPL-MCNC: 196 MG/DL
CO2 SERPL-SCNC: 24 MMOL/L
COLOR: YELLOW
CREAT SERPL-MCNC: 1.46 MG/DL
EGFR: 50 ML/MIN/1.73M2
EPITHELIAL CELLS: 0 /HPF
ESTIMATED AVERAGE GLUCOSE: 120 MG/DL
GLUCOSE QUALITATIVE U: NEGATIVE MG/DL
GLUCOSE SERPL-MCNC: 91 MG/DL
HBA1C MFR BLD HPLC: 5.8 %
HCT VFR BLD CALC: 41.6 %
HDLC SERPL-MCNC: 84 MG/DL
HGB BLD-MCNC: 13.8 G/DL
KETONES URINE: NEGATIVE MG/DL
LAMOTRIGINE SERPL-MCNC: 6.3 UG/ML
LDLC SERPL CALC-MCNC: 102 MG/DL
LEUKOCYTE ESTERASE URINE: NEGATIVE
MCHC RBC-ENTMCNC: 32.9 PG
MCHC RBC-ENTMCNC: 33.2 GM/DL
MCV RBC AUTO: 99 FL
MICROSCOPIC-UA: NORMAL
NITRITE URINE: NEGATIVE
NONHDLC SERPL-MCNC: 113 MG/DL
PH URINE: 5.5
PLATELET # BLD AUTO: 215 K/UL
POTASSIUM SERPL-SCNC: 4.5 MMOL/L
PROT SERPL-MCNC: 6.6 G/DL
PROTEIN URINE: NEGATIVE MG/DL
PSA SERPL-MCNC: 5.36 NG/ML
RBC # BLD: 4.2 M/UL
RBC # FLD: 13.3 %
RED BLOOD CELLS URINE: 1 /HPF
SODIUM SERPL-SCNC: 138 MMOL/L
SPECIFIC GRAVITY URINE: 1.02
T4 FREE SERPL-MCNC: 1.1 NG/DL
TRIGL SERPL-MCNC: 58 MG/DL
TSH SERPL-ACNC: 3.99 UIU/ML
UROBILINOGEN URINE: 0.2 MG/DL
WBC # FLD AUTO: 6.18 K/UL
WHITE BLOOD CELLS URINE: 0 /HPF

## 2023-10-13 ENCOUNTER — APPOINTMENT (OUTPATIENT)
Age: 73
End: 2023-10-13
Payer: MEDICARE

## 2023-10-13 PROCEDURE — 99213 OFFICE O/P EST LOW 20 MIN: CPT

## 2024-06-14 ENCOUNTER — APPOINTMENT (OUTPATIENT)
Age: 74
End: 2024-06-14
Payer: MEDICARE

## 2024-06-14 PROCEDURE — 99213 OFFICE O/P EST LOW 20 MIN: CPT

## 2024-07-05 ENCOUNTER — APPOINTMENT (OUTPATIENT)
Dept: INTERNAL MEDICINE | Facility: CLINIC | Age: 74
End: 2024-07-05
Payer: MEDICARE

## 2024-07-05 VITALS
BODY MASS INDEX: 23.52 KG/M2 | WEIGHT: 168 LBS | HEIGHT: 71 IN | TEMPERATURE: 97.9 F | HEART RATE: 61 BPM | OXYGEN SATURATION: 98 %

## 2024-07-05 DIAGNOSIS — E78.5 HYPERLIPIDEMIA, UNSPECIFIED: ICD-10-CM

## 2024-07-05 DIAGNOSIS — R53.83 OTHER FATIGUE: ICD-10-CM

## 2024-07-05 LAB
ALBUMIN SERPL ELPH-MCNC: 4.6 G/DL
ALP BLD-CCNC: 91 U/L
ALT SERPL-CCNC: 22 U/L
AMYLASE/CREAT SERPL: 129 U/L
ANION GAP SERPL CALC-SCNC: 13 MMOL/L
AST SERPL-CCNC: 28 U/L
BILIRUB SERPL-MCNC: 0.2 MG/DL
BUN SERPL-MCNC: 19 MG/DL
CALCIUM SERPL-MCNC: 9.6 MG/DL
CHLORIDE SERPL-SCNC: 103 MMOL/L
CHOLEST SERPL-MCNC: 171 MG/DL
CO2 SERPL-SCNC: 22 MMOL/L
CREAT SERPL-MCNC: 1.29 MG/DL
EGFR: 57 ML/MIN/1.73M2
ESTIMATED AVERAGE GLUCOSE: 117 MG/DL
GLUCOSE SERPL-MCNC: 99 MG/DL
HBA1C MFR BLD HPLC: 5.7 %
HCT VFR BLD CALC: 42.9 %
HDLC SERPL-MCNC: 83 MG/DL
HGB BLD-MCNC: 14.1 G/DL
LDLC SERPL CALC-MCNC: 78 MG/DL
LPL SERPL-CCNC: 51 U/L
MCHC RBC-ENTMCNC: 31.5 PG
MCHC RBC-ENTMCNC: 32.9 GM/DL
MCV RBC AUTO: 95.8 FL
NONHDLC SERPL-MCNC: 88 MG/DL
PLATELET # BLD AUTO: 241 K/UL
POTASSIUM SERPL-SCNC: 4.9 MMOL/L
PROT SERPL-MCNC: 6.9 G/DL
RBC # BLD: 4.48 M/UL
RBC # FLD: 12.5 %
SODIUM SERPL-SCNC: 139 MMOL/L
T4 FREE SERPL-MCNC: 1.3 NG/DL
TRIGL SERPL-MCNC: 50 MG/DL
TSH SERPL-ACNC: 4.08 UIU/ML
WBC # FLD AUTO: 6.25 K/UL

## 2024-07-05 PROCEDURE — 36415 COLL VENOUS BLD VENIPUNCTURE: CPT

## 2024-07-05 PROCEDURE — 99214 OFFICE O/P EST MOD 30 MIN: CPT

## 2024-07-06 VITALS — SYSTOLIC BLOOD PRESSURE: 115 MMHG | DIASTOLIC BLOOD PRESSURE: 72 MMHG

## 2024-07-08 ENCOUNTER — NON-APPOINTMENT (OUTPATIENT)
Age: 74
End: 2024-07-08

## 2024-07-08 ENCOUNTER — APPOINTMENT (OUTPATIENT)
Dept: CARDIOLOGY | Facility: CLINIC | Age: 74
End: 2024-07-08
Payer: MEDICARE

## 2024-07-08 VITALS
WEIGHT: 168 LBS | DIASTOLIC BLOOD PRESSURE: 70 MMHG | SYSTOLIC BLOOD PRESSURE: 108 MMHG | HEART RATE: 56 BPM | HEIGHT: 71 IN | BODY MASS INDEX: 23.52 KG/M2 | OXYGEN SATURATION: 96 %

## 2024-07-08 DIAGNOSIS — Z91.89 OTHER SPECIFIED PERSONAL RISK FACTORS, NOT ELSEWHERE CLASSIFIED: ICD-10-CM

## 2024-07-08 DIAGNOSIS — R42 DIZZINESS AND GIDDINESS: ICD-10-CM

## 2024-07-08 PROCEDURE — G2211 COMPLEX E/M VISIT ADD ON: CPT

## 2024-07-08 PROCEDURE — 99214 OFFICE O/P EST MOD 30 MIN: CPT

## 2024-07-08 PROCEDURE — 93000 ELECTROCARDIOGRAM COMPLETE: CPT | Mod: 59

## 2024-07-08 PROCEDURE — 93246 EXT ECG>7D<15D RECORDING: CPT

## 2024-08-05 ENCOUNTER — APPOINTMENT (OUTPATIENT)
Dept: CARDIOLOGY | Facility: CLINIC | Age: 74
End: 2024-08-05

## 2024-08-05 ENCOUNTER — NON-APPOINTMENT (OUTPATIENT)
Age: 74
End: 2024-08-05

## 2024-08-05 ENCOUNTER — APPOINTMENT (OUTPATIENT)
Dept: INTERNAL MEDICINE | Facility: CLINIC | Age: 74
End: 2024-08-05

## 2024-08-05 PROCEDURE — 93306 TTE W/DOPPLER COMPLETE: CPT

## 2024-08-05 PROCEDURE — G2211 COMPLEX E/M VISIT ADD ON: CPT

## 2024-08-05 PROCEDURE — 99214 OFFICE O/P EST MOD 30 MIN: CPT

## 2024-08-05 PROCEDURE — 93000 ELECTROCARDIOGRAM COMPLETE: CPT

## 2024-08-05 PROCEDURE — 99215 OFFICE O/P EST HI 40 MIN: CPT

## 2024-08-05 NOTE — CARDIOLOGY SUMMARY
[de-identified] : 6/5/2023 sinus bradycardia, otherwise normal. 7/8/2024 sinus bradycardia, normal. 8/5/2024 sinus bradycardia, normal. [de-identified] : 7/17/2023 exercised into stage 4, reached THR with some fatigue, no chest pain, normal EKG response. [de-identified] : 8/5/2024 technically difficult, but normal LV function, no evident regional wall motion abnormality, all atrial and ventricular chamber dimensions and wall thicknesses normal. normal valves.

## 2024-08-05 NOTE — DISCUSSION/SUMMARY
[Possible Cardiac Ischemia (Intermd Prob)] : possible cardiac ischemia (intermediate probability) [Patient] : the patient [EKG obtained to assist in diagnosis and management of assessed problem(s)] : EKG obtained to assist in diagnosis and management of assessed problem(s) [Medication Changes Per Orders] : Medication changes are as documented in orders [Coronary Artery Catheterization] : coronary artery catheterization [Deteriorating] : deteriorating [Orthostatic Hypotension] : orthostatic hypotension [Sick Sinus Syndrome] : sick sinus syndrome [Holter Monitor] : a Holter monitor [Echocardiogram] : an echocardiogram [de-identified] : aspirin 81 mg daily [de-identified] : load asprin 650 mg today, 81 mg daily [de-identified] : Fatigue with dizziness and nausea [de-identified] : multiple recurrences, two episodes with chest pain [FreeTextEntry1] : Concerning family history of coronary disease, on lipid lowering therapy, otherwise no cardiac medications. Had normal treadmill exercise stress test last year, normal echo today.

## 2024-08-05 NOTE — HISTORY OF PRESENT ILLNESS
[FreeTextEntry1] : Mr. Gerardo Valenzuela is a 74-year-old man with a family history of premature coronary artery disease and hypercholesterolemia presenting for evaluation and management of risk factors. He exercises regularly including elliptical , jogging and weights without chest pain, dyspnea or palpitations. There is no history of hypertension, diabetes and he has never been a cigarette smoker. His only complaint is afternoon fatigue which he can overcome for important occasions or to play golf, but does those activities with some fatigue.  In addition to his long-standing complaint of fatigue, now finds it associated with dizziness and associated nausea, need to lie down and improves in few minutes. He continues exercise without symptoms, playing golf and episodes are not specifically related to activity.  Was unable to wear patch, kept falling off. In past couple of weeks having more episodes of feeling unwell, occurring with mild exertion such as chasing grandchildren around Feels weak, lightheaded and then had chest pressure, all subsiding within minutes of lying down, but son who was present and is a physician told him to take an aspirin and also took an aspirin after another episode. However, plays golf, including this weekend in intense heat and humidity, uses cart, but had no symptoms.

## 2024-08-07 ENCOUNTER — OUTPATIENT (OUTPATIENT)
Dept: OUTPATIENT SERVICES | Facility: HOSPITAL | Age: 74
LOS: 1 days | End: 2024-08-07
Payer: MEDICARE

## 2024-08-07 ENCOUNTER — TRANSCRIPTION ENCOUNTER (OUTPATIENT)
Age: 74
End: 2024-08-07

## 2024-08-07 VITALS
TEMPERATURE: 98 F | HEIGHT: 71 IN | RESPIRATION RATE: 16 BRPM | DIASTOLIC BLOOD PRESSURE: 74 MMHG | SYSTOLIC BLOOD PRESSURE: 159 MMHG | OXYGEN SATURATION: 98 % | WEIGHT: 169.09 LBS | HEART RATE: 57 BPM

## 2024-08-07 VITALS
RESPIRATION RATE: 16 BRPM | OXYGEN SATURATION: 96 % | HEART RATE: 61 BPM | DIASTOLIC BLOOD PRESSURE: 79 MMHG | SYSTOLIC BLOOD PRESSURE: 135 MMHG

## 2024-08-07 DIAGNOSIS — S83.8X9A SPRAIN OF OTHER SPECIFIED PARTS OF UNSPECIFIED KNEE, INITIAL ENCOUNTER: Chronic | ICD-10-CM

## 2024-08-07 DIAGNOSIS — Z98.890 OTHER SPECIFIED POSTPROCEDURAL STATES: Chronic | ICD-10-CM

## 2024-08-07 DIAGNOSIS — R07.89 OTHER CHEST PAIN: ICD-10-CM

## 2024-08-07 PROBLEM — R07.9 CHEST PAIN, UNSPECIFIED TYPE: Status: ACTIVE | Noted: 2024-08-05

## 2024-08-07 LAB
ANION GAP SERPL CALC-SCNC: 13 MMOL/L — SIGNIFICANT CHANGE UP (ref 5–17)
BUN SERPL-MCNC: 21 MG/DL — SIGNIFICANT CHANGE UP (ref 7–23)
CALCIUM SERPL-MCNC: 9.4 MG/DL — SIGNIFICANT CHANGE UP (ref 8.4–10.5)
CHLORIDE SERPL-SCNC: 101 MMOL/L — SIGNIFICANT CHANGE UP (ref 96–108)
CO2 SERPL-SCNC: 24 MMOL/L — SIGNIFICANT CHANGE UP (ref 22–31)
CREAT SERPL-MCNC: 1.33 MG/DL — HIGH (ref 0.5–1.3)
EGFR: 55 ML/MIN/1.73M2 — LOW
GLUCOSE SERPL-MCNC: 96 MG/DL — SIGNIFICANT CHANGE UP (ref 70–99)
HCT VFR BLD CALC: 45.5 % — SIGNIFICANT CHANGE UP (ref 39–50)
HGB BLD-MCNC: 14.8 G/DL — SIGNIFICANT CHANGE UP (ref 13–17)
MCHC RBC-ENTMCNC: 31.3 PG — SIGNIFICANT CHANGE UP (ref 27–34)
MCHC RBC-ENTMCNC: 32.5 GM/DL — SIGNIFICANT CHANGE UP (ref 32–36)
MCV RBC AUTO: 96.2 FL — SIGNIFICANT CHANGE UP (ref 80–100)
NRBC # BLD: 0 /100 WBCS — SIGNIFICANT CHANGE UP (ref 0–0)
PLATELET # BLD AUTO: 232 K/UL — SIGNIFICANT CHANGE UP (ref 150–400)
POTASSIUM SERPL-MCNC: 4.5 MMOL/L — SIGNIFICANT CHANGE UP (ref 3.5–5.3)
POTASSIUM SERPL-SCNC: 4.5 MMOL/L — SIGNIFICANT CHANGE UP (ref 3.5–5.3)
RBC # BLD: 4.73 M/UL — SIGNIFICANT CHANGE UP (ref 4.2–5.8)
RBC # FLD: 12.5 % — SIGNIFICANT CHANGE UP (ref 10.3–14.5)
SODIUM SERPL-SCNC: 138 MMOL/L — SIGNIFICANT CHANGE UP (ref 135–145)
WBC # BLD: 6.86 K/UL — SIGNIFICANT CHANGE UP (ref 3.8–10.5)
WBC # FLD AUTO: 6.86 K/UL — SIGNIFICANT CHANGE UP (ref 3.8–10.5)

## 2024-08-07 PROCEDURE — 93005 ELECTROCARDIOGRAM TRACING: CPT

## 2024-08-07 PROCEDURE — 93010 ELECTROCARDIOGRAM REPORT: CPT

## 2024-08-07 PROCEDURE — 99152 MOD SED SAME PHYS/QHP 5/>YRS: CPT

## 2024-08-07 PROCEDURE — 93454 CORONARY ARTERY ANGIO S&I: CPT

## 2024-08-07 PROCEDURE — 80048 BASIC METABOLIC PNL TOTAL CA: CPT

## 2024-08-07 PROCEDURE — 93454 CORONARY ARTERY ANGIO S&I: CPT | Mod: 26

## 2024-08-07 PROCEDURE — 85027 COMPLETE CBC AUTOMATED: CPT

## 2024-08-07 PROCEDURE — C1887: CPT

## 2024-08-07 PROCEDURE — C1894: CPT

## 2024-08-07 PROCEDURE — C1769: CPT

## 2024-08-07 RX ORDER — ESCITALOPRAM OXALATE 20 MG
1 TABLET ORAL
Refills: 0 | DISCHARGE

## 2024-08-07 RX ORDER — FINASTERIDE 5 MG/1
1 TABLET, FILM COATED ORAL
Refills: 0 | DISCHARGE

## 2024-08-07 RX ORDER — ASPIRIN 500 MG
1 TABLET ORAL
Refills: 0 | DISCHARGE

## 2024-08-07 RX ORDER — BACTERIOSTATIC SODIUM CHLORIDE 0.9 %
250 VIAL (ML) INJECTION ONCE
Refills: 0 | Status: COMPLETED | OUTPATIENT
Start: 2024-08-07 | End: 2024-08-07

## 2024-08-07 RX ORDER — FAMOTIDINE 40 MG/1
1 TABLET, FILM COATED ORAL
Refills: 0 | DISCHARGE

## 2024-08-07 RX ORDER — ESCITALOPRAM OXALATE 20 MG
0 TABLET ORAL
Refills: 0 | DISCHARGE

## 2024-08-07 RX ORDER — GUAIFEN/P-PROPANOLAMIN/CODEINE
1 EXPECTORANT ORAL
Refills: 0 | DISCHARGE

## 2024-08-07 RX ORDER — PITAVASTATIN CALCIUM 1.04 MG/1
1 TABLET, FILM COATED ORAL
Refills: 0 | DISCHARGE

## 2024-08-07 RX ORDER — LAMOTRIGINE 100 MG/1
1.5 TABLET ORAL
Refills: 0 | DISCHARGE

## 2024-08-07 RX ORDER — LEVOTHYROXINE SODIUM 175 MCG
1 TABLET ORAL
Refills: 0 | DISCHARGE

## 2024-08-07 RX ORDER — OMEPRAZOLE 100 %
1 POWDER (GRAM) MISCELLANEOUS
Refills: 0 | DISCHARGE

## 2024-08-07 RX ORDER — BACTERIOSTATIC SODIUM CHLORIDE 0.9 %
150 VIAL (ML) INJECTION
Refills: 0 | Status: DISCONTINUED | OUTPATIENT
Start: 2024-08-07 | End: 2024-08-21

## 2024-08-07 RX ADMIN — Medication 500 MILLILITER(S): at 09:51

## 2024-08-07 RX ADMIN — Medication 75 MILLILITER(S): at 09:51

## 2024-08-07 NOTE — H&P CARDIOLOGY - NSICDXPASTMEDICALHX_GEN_ALL_CORE_FT
PAST MEDICAL HISTORY:  Esophagus, Mckeon's     GERD (gastroesophageal reflux disease)     History of IBS     Seizure

## 2024-08-07 NOTE — HISTORY OF PRESENT ILLNESS
[de-identified] : The patient has been having fatigue, tiredness which is worse for 2-3 months, and especially worse in the last two weeks.  There are episodes in which he needs to lie down.    A few days ago, he had fatigue and a chest pressure with lying down for 20 minutes. The symptoms are usually at rest and not related to exertion.  The pain doesn't radiate.  No dyspnea, orthopnea.    He can hadve diaphoresis.

## 2024-08-07 NOTE — ASU PATIENT PROFILE, ADULT - AS SC BRADEN FRICTION
What Is The Reason For Today's Visit?: Full Body Skin Examination
What Is The Reason For Today's Visit? (Being Monitored For X): concerning skin lesions on an annual basis
How Severe Are Your Spot(S)?: mild
(3) no apparent problem

## 2024-08-07 NOTE — ASSESSMENT
[FreeTextEntry1] : The patient is having fatigue and has had atypical chest pain.  He saw his cardiologist.  A cardiac catheterization is planned to rule out a cardiac cause.    Recent blood tests unremarkable and did not show a cause for the fatigue.  TFTs, renal, CBC ok.  Lipids fine, HgBa1c 5.7.  He can also discuss the medications with his psychiatrist to see if that could be contributing to the fatigue.  [place] [facility]  There is another test pending today but the patient doesn't know what test it is.  He is then going to return later this week to make a treatment decision.

## 2024-08-07 NOTE — H&P CARDIOLOGY - HISTORY OF PRESENT ILLNESS
75 y/o M with Pmhx of fam hx of premature heart disease, seizure d/o, IBS, GERD Mckeon's esophagus. He complains of afternoon fatigue and had an episode of chest pain last week with dizziness and nausea. He was referred by Dr. Lozano and presents today for cardiac angiogram. He states he had a negative stress test about 1 year ago        Cardiologist: Dr. Lozano

## 2024-08-07 NOTE — ASU DISCHARGE PLAN (ADULT/PEDIATRIC) - CARE PROVIDER_API CALL
Victor Hugo Lozano  Cardiovascular Disease  [address]  [place] [address]  Phone: [phone]  Fax: [phone]  Follow Up Time: 2 weeks

## 2024-09-09 ENCOUNTER — APPOINTMENT (OUTPATIENT)
Dept: INTERNAL MEDICINE | Facility: CLINIC | Age: 74
End: 2024-09-09

## 2024-11-26 ENCOUNTER — APPOINTMENT (OUTPATIENT)
Age: 74
End: 2024-11-26
Payer: MEDICARE

## 2024-11-26 PROCEDURE — 99213 OFFICE O/P EST LOW 20 MIN: CPT

## 2025-06-10 PROBLEM — R56.9 UNSPECIFIED CONVULSIONS: Chronic | Status: ACTIVE | Noted: 2024-08-07

## 2025-06-10 PROBLEM — K22.70 BARRETT'S ESOPHAGUS WITHOUT DYSPLASIA: Chronic | Status: ACTIVE | Noted: 2024-08-07

## 2025-06-10 PROBLEM — Z87.19 PERSONAL HISTORY OF OTHER DISEASES OF THE DIGESTIVE SYSTEM: Chronic | Status: ACTIVE | Noted: 2024-08-07

## 2025-06-10 PROBLEM — K21.9 GASTRO-ESOPHAGEAL REFLUX DISEASE WITHOUT ESOPHAGITIS: Chronic | Status: ACTIVE | Noted: 2024-08-07

## 2025-07-14 ENCOUNTER — OUTPATIENT (OUTPATIENT)
Dept: OUTPATIENT SERVICES | Facility: HOSPITAL | Age: 75
LOS: 1 days | End: 2025-07-14
Payer: MEDICARE

## 2025-07-14 ENCOUNTER — TRANSCRIPTION ENCOUNTER (OUTPATIENT)
Age: 75
End: 2025-07-14

## 2025-07-14 ENCOUNTER — APPOINTMENT (OUTPATIENT)
Dept: INTERNAL MEDICINE | Facility: CLINIC | Age: 75
End: 2025-07-14
Payer: MEDICARE

## 2025-07-14 ENCOUNTER — APPOINTMENT (OUTPATIENT)
Dept: ULTRASOUND IMAGING | Facility: CLINIC | Age: 75
End: 2025-07-14
Payer: MEDICARE

## 2025-07-14 VITALS
TEMPERATURE: 98.1 F | BODY MASS INDEX: 23.52 KG/M2 | OXYGEN SATURATION: 98 % | HEART RATE: 60 BPM | WEIGHT: 168 LBS | HEIGHT: 71 IN

## 2025-07-14 DIAGNOSIS — R22.1 LOCALIZED SWELLING, MASS AND LUMP, NECK: ICD-10-CM

## 2025-07-14 DIAGNOSIS — S83.8X9A SPRAIN OF OTHER SPECIFIED PARTS OF UNSPECIFIED KNEE, INITIAL ENCOUNTER: Chronic | ICD-10-CM

## 2025-07-14 PROBLEM — Z23 ENCOUNTER FOR IMMUNIZATION: Status: RESOLVED | Noted: 2022-08-15 | Resolved: 2025-07-14

## 2025-07-14 PROBLEM — J31.0 OTHER RHINITIS: Status: RESOLVED | Noted: 2023-08-15 | Resolved: 2025-07-14

## 2025-07-14 PROCEDURE — 99214 OFFICE O/P EST MOD 30 MIN: CPT

## 2025-07-14 PROCEDURE — 76536 US EXAM OF HEAD AND NECK: CPT

## 2025-07-14 PROCEDURE — 76536 US EXAM OF HEAD AND NECK: CPT | Mod: 26

## 2025-07-14 PROCEDURE — G2211 COMPLEX E/M VISIT ADD ON: CPT

## 2025-07-17 ENCOUNTER — APPOINTMENT (OUTPATIENT)
Dept: SPINE | Facility: CLINIC | Age: 75
End: 2025-07-17
Payer: MEDICARE

## 2025-07-17 VITALS
WEIGHT: 168 LBS | DIASTOLIC BLOOD PRESSURE: 77 MMHG | HEART RATE: 62 BPM | SYSTOLIC BLOOD PRESSURE: 149 MMHG | BODY MASS INDEX: 23.52 KG/M2 | HEIGHT: 71 IN | RESPIRATION RATE: 18 BRPM | OXYGEN SATURATION: 98 %

## 2025-07-17 VITALS — SYSTOLIC BLOOD PRESSURE: 135 MMHG | DIASTOLIC BLOOD PRESSURE: 72 MMHG

## 2025-07-17 PROBLEM — Z87.39 HISTORY OF LEFT FOOT DROP: Status: ACTIVE | Noted: 2025-07-17

## 2025-07-17 PROBLEM — M48.061 LUMBAR STENOSIS: Status: ACTIVE | Noted: 2025-07-17

## 2025-07-17 PROCEDURE — 99203 OFFICE O/P NEW LOW 30 MIN: CPT
